# Patient Record
Sex: FEMALE | Race: BLACK OR AFRICAN AMERICAN | NOT HISPANIC OR LATINO | Employment: OTHER | URBAN - METROPOLITAN AREA
[De-identification: names, ages, dates, MRNs, and addresses within clinical notes are randomized per-mention and may not be internally consistent; named-entity substitution may affect disease eponyms.]

---

## 2023-07-06 ENCOUNTER — APPOINTMENT (OUTPATIENT)
Dept: ULTRASOUND IMAGING | Facility: HOSPITAL | Age: 33
End: 2023-07-06
Payer: COMMERCIAL

## 2023-07-06 ENCOUNTER — HOSPITAL ENCOUNTER (EMERGENCY)
Facility: HOSPITAL | Age: 33
Discharge: HOME | End: 2023-07-06
Attending: STUDENT IN AN ORGANIZED HEALTH CARE EDUCATION/TRAINING PROGRAM | Admitting: STUDENT IN AN ORGANIZED HEALTH CARE EDUCATION/TRAINING PROGRAM
Payer: COMMERCIAL

## 2023-07-06 VITALS
WEIGHT: 160 LBS | HEART RATE: 94 BPM | RESPIRATION RATE: 20 BRPM | DIASTOLIC BLOOD PRESSURE: 75 MMHG | BODY MASS INDEX: 31.41 KG/M2 | HEIGHT: 60 IN | OXYGEN SATURATION: 99 % | SYSTOLIC BLOOD PRESSURE: 136 MMHG | TEMPERATURE: 98.9 F

## 2023-07-06 DIAGNOSIS — O20.9 VAGINAL BLEEDING IN PREGNANCY, FIRST TRIMESTER: ICD-10-CM

## 2023-07-06 LAB
ABO/RH(D): NORMAL
ANION GAP SERPL CALCULATED.3IONS-SCNC: 13 MMOL/L (ref 7–15)
ANTIBODY SCREEN: NEGATIVE
APTT PPP: 27 SECONDS (ref 22–38)
BASOPHILS # BLD AUTO: 0.1 10E3/UL (ref 0–0.2)
BASOPHILS NFR BLD AUTO: 1 %
BUN SERPL-MCNC: 5.9 MG/DL (ref 6–20)
CALCIUM SERPL-MCNC: 9.6 MG/DL (ref 8.6–10)
CHLORIDE SERPL-SCNC: 101 MMOL/L (ref 98–107)
CREAT SERPL-MCNC: 0.67 MG/DL (ref 0.51–0.95)
DEPRECATED HCO3 PLAS-SCNC: 22 MMOL/L (ref 22–29)
EOSINOPHIL # BLD AUTO: 0.1 10E3/UL (ref 0–0.7)
EOSINOPHIL NFR BLD AUTO: 1 %
ERYTHROCYTE [DISTWIDTH] IN BLOOD BY AUTOMATED COUNT: 13.1 % (ref 10–15)
GFR SERPL CREATININE-BSD FRML MDRD: >90 ML/MIN/1.73M2
GLUCOSE SERPL-MCNC: 78 MG/DL (ref 70–99)
HCG INTACT+B SERPL-ACNC: 1059 MIU/ML
HCT VFR BLD AUTO: 41.1 % (ref 35–47)
HGB BLD-MCNC: 13.5 G/DL (ref 11.7–15.7)
IMM GRANULOCYTES # BLD: 0.1 10E3/UL
IMM GRANULOCYTES NFR BLD: 1 %
INR PPP: 1.02 (ref 0.85–1.15)
LYMPHOCYTES # BLD AUTO: 3.3 10E3/UL (ref 0.8–5.3)
LYMPHOCYTES NFR BLD AUTO: 24 %
MCH RBC QN AUTO: 25.7 PG (ref 26.5–33)
MCHC RBC AUTO-ENTMCNC: 32.8 G/DL (ref 31.5–36.5)
MCV RBC AUTO: 78 FL (ref 78–100)
MONOCYTES # BLD AUTO: 0.7 10E3/UL (ref 0–1.3)
MONOCYTES NFR BLD AUTO: 5 %
NEUTROPHILS # BLD AUTO: 10 10E3/UL (ref 1.6–8.3)
NEUTROPHILS NFR BLD AUTO: 68 %
NRBC # BLD AUTO: 0 10E3/UL
NRBC BLD AUTO-RTO: 0 /100
PLATELET # BLD AUTO: 308 10E3/UL (ref 150–450)
POTASSIUM SERPL-SCNC: 3.6 MMOL/L (ref 3.4–5.3)
RBC # BLD AUTO: 5.25 10E6/UL (ref 3.8–5.2)
SODIUM SERPL-SCNC: 136 MMOL/L (ref 136–145)
SPECIMEN EXPIRATION DATE: NORMAL
WBC # BLD AUTO: 14.2 10E3/UL (ref 4–11)

## 2023-07-06 PROCEDURE — 84702 CHORIONIC GONADOTROPIN TEST: CPT | Performed by: STUDENT IN AN ORGANIZED HEALTH CARE EDUCATION/TRAINING PROGRAM

## 2023-07-06 PROCEDURE — 258N000003 HC RX IP 258 OP 636

## 2023-07-06 PROCEDURE — 82310 ASSAY OF CALCIUM: CPT | Performed by: STUDENT IN AN ORGANIZED HEALTH CARE EDUCATION/TRAINING PROGRAM

## 2023-07-06 PROCEDURE — 36415 COLL VENOUS BLD VENIPUNCTURE: CPT | Performed by: STUDENT IN AN ORGANIZED HEALTH CARE EDUCATION/TRAINING PROGRAM

## 2023-07-06 PROCEDURE — 96360 HYDRATION IV INFUSION INIT: CPT

## 2023-07-06 PROCEDURE — 85730 THROMBOPLASTIN TIME PARTIAL: CPT | Performed by: STUDENT IN AN ORGANIZED HEALTH CARE EDUCATION/TRAINING PROGRAM

## 2023-07-06 PROCEDURE — 85610 PROTHROMBIN TIME: CPT | Performed by: STUDENT IN AN ORGANIZED HEALTH CARE EDUCATION/TRAINING PROGRAM

## 2023-07-06 PROCEDURE — 99285 EMERGENCY DEPT VISIT HI MDM: CPT | Mod: 25

## 2023-07-06 PROCEDURE — 85004 AUTOMATED DIFF WBC COUNT: CPT | Performed by: STUDENT IN AN ORGANIZED HEALTH CARE EDUCATION/TRAINING PROGRAM

## 2023-07-06 PROCEDURE — 86901 BLOOD TYPING SEROLOGIC RH(D): CPT | Performed by: STUDENT IN AN ORGANIZED HEALTH CARE EDUCATION/TRAINING PROGRAM

## 2023-07-06 PROCEDURE — 86850 RBC ANTIBODY SCREEN: CPT | Performed by: STUDENT IN AN ORGANIZED HEALTH CARE EDUCATION/TRAINING PROGRAM

## 2023-07-06 PROCEDURE — 76801 OB US < 14 WKS SINGLE FETUS: CPT

## 2023-07-06 RX ADMIN — SODIUM CHLORIDE 1000 ML: 9 INJECTION, SOLUTION INTRAVENOUS at 19:55

## 2023-07-06 ASSESSMENT — ACTIVITIES OF DAILY LIVING (ADL): ADLS_ACUITY_SCORE: 35

## 2023-07-06 ASSESSMENT — ENCOUNTER SYMPTOMS: ABDOMINAL PAIN: 0

## 2023-07-06 NOTE — ED TRIAGE NOTES
Patient brought to ED by her  for evaluation of miscarriage and possible need for D&C. Patient stated she started bleeding on 7/4 and was seen at [facility] today. Was between 7-8 weeks pregnant. Has bleeding still but hasn't soaked through a pad today. Patient stated she was told she may need a D&C. Had an US and blood work earlier today.     Triage Assessment     Row Name 07/06/23 7228       Triage Assessment (Adult)    Airway WDL WDL       Respiratory WDL    Respiratory WDL WDL       Skin Circulation/Temperature WDL    Skin Circulation/Temperature WDL WDL       Cardiac WDL    Cardiac WDL WDL       Peripheral/Neurovascular WDL    Peripheral Neurovascular WDL WDL       Cognitive/Neuro/Behavioral WDL    Cognitive/Neuro/Behavioral WDL WDL

## 2023-07-06 NOTE — ED PROVIDER NOTES
EMERGENCY DEPARTMENT ENCOUNTER      NAME: Supa Castro  AGE: 32 year old female  YOB: 1990  MRN: [id number]  EVALUATION DATE & TIME: No admission date for patient encounter.    PCP: No Ref-Primary, Physician    ED PROVIDER: June Rolon PA-C      Chief Complaint   Patient presents with     Miscarriage         FINAL IMPRESSION:  1. Vaginal bleeding in pregnancy, first trimester          ED COURSE & MEDICAL DECISION MAKIN:31 PM I met with the patient to gather history and perform my exam. ED course and treatment discussed. Patient seen in [facility] due to critical capacity and boarding crisis leaving no ED rooms available.  6:55 PM I staffed the patient with Dr. Feng.  10:30 PM I spoke with Dr. Smith with OBGYN  10:33 PM I updated the patient with lab and imaging results and discussed the plan for discharge.       Pertinent Labs & Imaging studies reviewed. (See chart for details)  32 year old female presents to the Emergency Department for evaluation of vaginal bleeding in setting of current pregnancy. History of previous abortions/miscarriages in the past, no history of ectopic pregnancy. Per patient, seen at [facility] earlier today had labs and US performed, left prior to results and states she received a call from ED provider alerting patient to come back to ED for OB/GYN evaluation and possible medication-induced  vs D&C - unfortunately, encounter/labs/imaging are not available in Care Everywhere; therefore, labs and imaging were repeated today. Upon exam, patient is afebrile, hemodynamically stable, and in no acute distress. Benign abdominal exam. Differential diagnosis includes but not limited to spontaneous , missed/incomplete  with retained products of conception, ectopic pregnancy, subchorionic hemorrhage, electrolyte abnormality, anemia, dehydration. Based on patient's presenting symptoms, laboratories and imaging were ordered.    CBC with  leukocytosis at 14.2. BMP unremarkable. Rh +, no indication for RhoGAM. PTT and INR WNL. Beta hCG 1059. US revealed A single elongated irregular-appearing gestational sac is present with small amount of debris within the gestational sac consistent with gestational age of 6 weeks 1 day. Given no access to prior US or beta hCG, unable to definitively determine if pregnancy viable or not and will require repeat US and beta hCG.    Patient declined pain medication upon arrival. Symptoms and workup most consistent with likely spontaneous . Patient was made aware of the above findings. Plan to discharge patient home with strict return precautions and close follow up with their OB/GYN next week for reevaluation with repeat US and beta-hCG. The patient was stable and well appearing upon discharge. The patient was advised to return to the ER if any new or worsening symptoms develop. The patient verbalizes understanding and agrees with the plan.     Medical Decision Making    History:    Supplemental history from: Documented in chart, if applicable    External Record(s) reviewed: Documented in chart, if applicable.    Work Up:    Chart documentation includes differential considered and any EKGs or imaging independently interpreted by provider, where specified.    In additional to work up documented, I considered the following work up: Documented in chart, if applicable.    External consultation:    Discussion of management with another provider: Documented in chart, if applicable and OB-Gyn    Complicating factors:    Care impacted by chronic illness: N/A    Care affected by social determinants of health: N/A    Disposition considerations: Discharge. No recommendations on prescription strength medication(s). See documentation for any additional details.        MEDICATIONS GIVEN IN THE EMERGENCY:  Medications   0.9% sodium chloride BOLUS (0 mLs Intravenous Stopped 23)       NEW PRESCRIPTIONS STARTED AT TODAY'S  "ER VISIT  There are no discharge medications for this patient.         =================================================================    HPI    Patient information was obtained from: Patient     Use of : N/A       Supa Castro is a 32 year old female with a pertinent history of miscarriage (self reported), spontaneous vaginal delivery, HTN, and anxiety who presents to this ED via walk-in for evaluation of a miscarriage    The patient reports she was seen earlier today at [facility] for vaginal bleeding and abdominal cramping. The patient reports qualitative HCG, ultrasound, and CBC were performed. The patient left before results came back because \"they made us pay for parking\". The patient reports she received a call telling her that the ultrasound showed a miscarriage and asking her to come back for a pelvic exam and consult with OB.   The patient also notes was seen at \"[facility]\" last week and had a \"limited ultrasound\" and states they told her \"I can tell you what we don't see\" and that she would be referred to a doctor if something was wrong, but the patient notes she was not referred so she assumed everything was okay.  Of note, upon recheck, the patient reported being seen at [facility] in [place], [place].   Of note, no records are visible of patient's visits to [facility], [facility], or [facility].    The patient reports she \"just recently\" found out she was pregnant and thinks she was about 7 weeks pregnant. The patient notes that she is not feeling any abdominal pain right now and notes that she is still having vaginal bleeding with some clots. The patient has not taken any medications for this.    The patient notes this is not her first pregnancy and has living children. The patient notes a history of prior abortions and denies a history of ectopic pregnancies.    REVIEW OF SYSTEMS   Review of Systems   Constitutional: Negative for chills " and fever.   Respiratory: Negative for shortness of breath.    Cardiovascular: Negative for chest pain, palpitations and leg swelling.   Gastrointestinal: Negative for abdominal pain, nausea and vomiting.   Genitourinary: Positive for vaginal bleeding (with clots). Negative for dysuria, hematuria and pelvic pain.   Neurological: Negative for weakness, light-headedness and headaches.   Hematological: Does not bruise/bleed easily.   All other systems reviewed and are negative.     PAST MEDICAL HISTORY:  No past medical history on file.    PAST SURGICAL HISTORY:  No past surgical history on file.        CURRENT MEDICATIONS:    No current outpatient medications on file.      ALLERGIES:  No Known Allergies    FAMILY HISTORY:  No family history on file.    SOCIAL HISTORY:   Social History     Socioeconomic History     Marital status: Single       VITALS:  /75   Pulse 94   Temp 98.9  F (37.2  C) (Oral)   Resp 20   Ht 1.524 m (5')   Wt 72.6 kg (160 lb)   LMP 05/08/2023 (Approximate)   SpO2 99%   BMI 31.25 kg/m      PHYSICAL EXAM    Constitutional:  Alert, in no acute distress. Cooperative.  EYES: Conjunctivae clear.  HENT:  Atraumatic, normocephalic.  Respiratory:  Respirations even, unlabored, in no acute respiratory distress.  Cardiovascular:  Regular rate, good peripheral perfusion.  GI: Soft, flat, non-distended. No abdominal tenderness to palpation. No guarding, rebound, or peritoneal signs.   Musculoskeletal:  No edema. No cyanosis. Range of motion major extremities intact.    Integument: Warm, Dry.   Neurologic:  Alert & oriented. No focal deficits noted.  Psych: Normal mood and affect.      LAB:  All pertinent labs reviewed and interpreted.  Results for orders placed or performed during the hospital encounter of 07/06/23   OB  US 1st trimester w transvag    Impression    IMPRESSION:   Single irregular gestational sac, which is low-lying within the uterus and contains heterogeneous debris The mean sac  diameter is 1.3 cm which corresponds with a gestational age of 6 weeks 1 day. The absence of a yolk sac and fetal pole at this time is   within normal limits although the irregular appearance of the gestational sac are suspicious for early pregnancy failure, and serial beta-hCG levels be obtained and follow-up ultrasound and 7-13 days is recommended per guidelines listed below.       Findings Suspicious, But Not Diagnostic of Pregnancy Failure    CRL< 7mm and no FHR  MSD 16-24 mm and no embryo  Absence of embryo with FHR 7-10 days after previous US showed a gestational sac with a yolk sac   Absence of embryo with FHR 7-13 days after previous US showed a gestational sac without a yolk sac  Absence of embryo >6 weeks after LMP  Enlarged yolk sac (>7mm)  Empty Amnion  Small gestational sac compared to embryo (MSD-CRL <5mm)    Reference: Diagnostic Criteria for Nonviable Pregnancy Early in the First Trimester. Ultrasound Quarterly; 30(1): 3-9, March 2014   Basic metabolic panel   Result Value Ref Range    Sodium 136 136 - 145 mmol/L    Potassium 3.6 3.4 - 5.3 mmol/L    Chloride 101 98 - 107 mmol/L    Carbon Dioxide (CO2) 22 22 - 29 mmol/L    Anion Gap 13 7 - 15 mmol/L    Urea Nitrogen 5.9 (L) 6.0 - 20.0 mg/dL    Creatinine 0.67 0.51 - 0.95 mg/dL    Calcium 9.6 8.6 - 10.0 mg/dL    Glucose 78 70 - 99 mg/dL    GFR Estimate >90 >60 mL/min/1.73m2   Partial thromboplastin time   Result Value Ref Range    aPTT 27 22 - 38 Seconds   Result Value Ref Range    INR 1.02 0.85 - 1.15   HCG quantitative pregnancy   Result Value Ref Range    hCG Quantitative 1,059 (H) <5 mIU/mL   CBC with platelets and differential   Result Value Ref Range    WBC Count 14.2 (H) 4.0 - 11.0 10e3/uL    RBC Count 5.25 (H) 3.80 - 5.20 10e6/uL    Hemoglobin 13.5 11.7 - 15.7 g/dL    Hematocrit 41.1 35.0 - 47.0 %    MCV 78 78 - 100 fL    MCH 25.7 (L) 26.5 - 33.0 pg    MCHC 32.8 31.5 - 36.5 g/dL    RDW 13.1 10.0 - 15.0 %    Platelet Count 308 150 - 450 10e3/uL     % Neutrophils 68 %    % Lymphocytes 24 %    % Monocytes 5 %    % Eosinophils 1 %    % Basophils 1 %    % Immature Granulocytes 1 %    NRBCs per 100 WBC 0 <1 /100    Absolute Neutrophils 10.0 (H) 1.6 - 8.3 10e3/uL    Absolute Lymphocytes 3.3 0.8 - 5.3 10e3/uL    Absolute Monocytes 0.7 0.0 - 1.3 10e3/uL    Absolute Eosinophils 0.1 0.0 - 0.7 10e3/uL    Absolute Basophils 0.1 0.0 - 0.2 10e3/uL    Absolute Immature Granulocytes 0.1 <=0.4 10e3/uL    Absolute NRBCs 0.0 10e3/uL   Adult Type and Screen   Result Value Ref Range    ABO/RH(D) A POS     Antibody Screen Negative Negative    SPECIMEN EXPIRATION DATE [id number]        RADIOLOGY:  Reviewed all pertinent imaging. Please see official radiology report.  OB  US 1st trimester w transvag   Final Result   IMPRESSION:    Single irregular gestational sac, which is low-lying within the uterus and contains heterogeneous debris The mean sac diameter is 1.3 cm which corresponds with a gestational age of 6 weeks 1 day. The absence of a yolk sac and fetal pole at this time is    within normal limits although the irregular appearance of the gestational sac are suspicious for early pregnancy failure, and serial beta-hCG levels be obtained and follow-up ultrasound and 7-13 days is recommended per guidelines listed below.          Findings Suspicious, But Not Diagnostic of Pregnancy Failure      CRL< 7mm and no FHR   MSD 16-24 mm and no embryo   Absence of embryo with FHR 7-10 days after previous US showed a gestational sac with a yolk sac    Absence of embryo with FHR 7-13 days after previous US showed a gestational sac without a yolk sac   Absence of embryo >6 weeks after LMP   Enlarged yolk sac (>7mm)   Empty Amnion   Small gestational sac compared to embryo (MSD-CRL <5mm)      Reference: Diagnostic Criteria for Nonviable Pregnancy Early in the First Trimester. Ultrasound Quarterly; 30(1): 3-9, March 2014        Nayla ROSENBERG, [place] serving as a scribe to document services  personally performed by June Rolon PA-C based on my observation and the provider's statements to me. I, June Rolon PA-C, attest that Nayla Elena is acting in a scribe capacity, has observed my performance of the services and has documented them in accordance with my direction.    June Rolon PA-C  [facility] EMERGENCY DEPARTMENT  [address] [place] [id number]  [phone]      June Rolon PA-C  07/07/23 0023

## 2023-07-07 ASSESSMENT — ENCOUNTER SYMPTOMS
CHILLS: 0
SHORTNESS OF BREATH: 0
HEADACHES: 0
FEVER: 0
VOMITING: 0
DYSURIA: 0
NAUSEA: 0
WEAKNESS: 0
BRUISES/BLEEDS EASILY: 0
LIGHT-HEADEDNESS: 0
HEMATURIA: 0
PALPITATIONS: 0

## 2023-07-07 NOTE — ED PROVIDER NOTES
Emergency Department Midlevel Supervisory Note     I personally saw the patient and performed a substantive portion of the visit including all aspects of the medical decision making.    ED Course:  6:55 PM June Rolon PA-C staffed patient with me. I agree with their assessment and plan of management, and I will see the patient.    Brief HPI:     Supa Castro is a 32 year old female who presents for evaluation of vaginal bleeding. Evaluated at [facility] earlier today had labs and US performed, left prior to results and states she received a call from ED provider alerting patient to come back to ED for OB/GYN evaluation and possible medication-induced  vs D&C. Repeat workup as we do not have access to those via CareEverywhere.    MDM:  32-year-old female who estimates she is approximately 7 to 8 weeks pregnant who presents with vaginal bleeding.  Patient's had very light vaginal bleeding.  She says she had a work-up at [facility] today and was called and told to come back for possible DIC or medication.  We can not find any of this information in care everywhere. Beta HCG 1059. She is Rh+ and does not require RhoGAM.  No significant bleeding and reassuring hemoglobin.  Ultrasound shows irregular gestational sac (low laying, gestitional age of 6 weeks, 1 day) which could be within the normal limits but are suspicious for early pregnancy failure. This could be an early pregnancy vs miscarriage. We did consult OB on-call here.  They agree with the plan for discharge with close outpatient follow up.         1. Vaginal bleeding in pregnancy, first trimester        Labs and Imaging:  Results for orders placed or performed during the hospital encounter of 23   OB  US 1st trimester w transvag    Impression    IMPRESSION:   Single irregular gestational sac, which is low-lying within the uterus and contains heterogeneous debris The mean sac diameter is 1.3 cm which corresponds with a  gestational age of 6 weeks 1 day. The absence of a yolk sac and fetal pole at this time is   within normal limits although the irregular appearance of the gestational sac are suspicious for early pregnancy failure, and serial beta-hCG levels be obtained and follow-up ultrasound and 7-13 days is recommended per guidelines listed below.       Findings Suspicious, But Not Diagnostic of Pregnancy Failure    CRL< 7mm and no FHR  MSD 16-24 mm and no embryo  Absence of embryo with FHR 7-10 days after previous US showed a gestational sac with a yolk sac   Absence of embryo with FHR 7-13 days after previous US showed a gestational sac without a yolk sac  Absence of embryo >6 weeks after LMP  Enlarged yolk sac (>7mm)  Empty Amnion  Small gestational sac compared to embryo (MSD-CRL <5mm)    Reference: Diagnostic Criteria for Nonviable Pregnancy Early in the First Trimester. Ultrasound Quarterly; 30(1): 3-9, March 2014   Basic metabolic panel   Result Value Ref Range    Sodium 136 136 - 145 mmol/L    Potassium 3.6 3.4 - 5.3 mmol/L    Chloride 101 98 - 107 mmol/L    Carbon Dioxide (CO2) 22 22 - 29 mmol/L    Anion Gap 13 7 - 15 mmol/L    Urea Nitrogen 5.9 (L) 6.0 - 20.0 mg/dL    Creatinine 0.67 0.51 - 0.95 mg/dL    Calcium 9.6 8.6 - 10.0 mg/dL    Glucose 78 70 - 99 mg/dL    GFR Estimate >90 >60 mL/min/1.73m2   Partial thromboplastin time   Result Value Ref Range    aPTT 27 22 - 38 Seconds   Result Value Ref Range    INR 1.02 0.85 - 1.15   HCG quantitative pregnancy   Result Value Ref Range    hCG Quantitative 1,059 (H) <5 mIU/mL   CBC with platelets and differential   Result Value Ref Range    WBC Count 14.2 (H) 4.0 - 11.0 10e3/uL    RBC Count 5.25 (H) 3.80 - 5.20 10e6/uL    Hemoglobin 13.5 11.7 - 15.7 g/dL    Hematocrit 41.1 35.0 - 47.0 %    MCV 78 78 - 100 fL    MCH 25.7 (L) 26.5 - 33.0 pg    MCHC 32.8 31.5 - 36.5 g/dL    RDW 13.1 10.0 - 15.0 %    Platelet Count 308 150 - 450 10e3/uL    % Neutrophils 68 %    % Lymphocytes 24 %     % Monocytes 5 %    % Eosinophils 1 %    % Basophils 1 %    % Immature Granulocytes 1 %    NRBCs per 100 WBC 0 <1 /100    Absolute Neutrophils 10.0 (H) 1.6 - 8.3 10e3/uL    Absolute Lymphocytes 3.3 0.8 - 5.3 10e3/uL    Absolute Monocytes 0.7 0.0 - 1.3 10e3/uL    Absolute Eosinophils 0.1 0.0 - 0.7 10e3/uL    Absolute Basophils 0.1 0.0 - 0.2 10e3/uL    Absolute Immature Granulocytes 0.1 <=0.4 10e3/uL    Absolute NRBCs 0.0 10e3/uL   Adult Type and Screen   Result Value Ref Range    ABO/RH(D) A POS     Antibody Screen Negative Negative    SPECIMEN EXPIRATION DATE [id number]      I have reviewed the relevant laboratory and radiology studies    Procedures:  I was present for the key portions of this procedure: none      I, Silver Alvarez, am serving as a scribe to document services personally performed by Dr. Tamiko Feng based on my observation and the provider's statements to me. I, Tamiko Feng MD attest that Silver Alvarez is acting in a scribe capacity, has observed my performance of the services and has documented them in accordance with my direction.     Tamiko Feng M.D.  Emergency Medicine  [facility] EMERGENCY DEPARTMENT  [address] [facility] [id number] [phone]  Dept: [phone]     Tamiko Feng MD  07/06/23 8891

## 2023-07-07 NOTE — DISCHARGE INSTRUCTIONS
You were seen in the ER for evaluation of vaginal bleeding during pregnancy. Your ultrasound showed evidence of intrauterine pregnancy, but unable to determine viability at this time as discussed.     Please follow up with your OB/GYN for repeat ultrasound and beta-hCG.     Return to the ER if any new or worsening symptoms develop including increased vaginal bleeding (soaking pad/tampon every hour), abdominal pain, chest pain, shortness of breath, palpitations, lightheaded/dizzy, fainting, or any other concerning symptoms.

## 2024-05-03 ENCOUNTER — HOSPITAL ENCOUNTER (EMERGENCY)
Facility: HOSPITAL | Age: 34
Discharge: HOME | End: 2024-05-03
Attending: EMERGENCY MEDICINE | Admitting: EMERGENCY MEDICINE

## 2024-05-03 VITALS
RESPIRATION RATE: 18 BRPM | HEART RATE: 75 BPM | TEMPERATURE: 98.4 F | OXYGEN SATURATION: 100 % | DIASTOLIC BLOOD PRESSURE: 94 MMHG | SYSTOLIC BLOOD PRESSURE: 141 MMHG

## 2024-05-03 DIAGNOSIS — L50.9 URTICARIA: ICD-10-CM

## 2024-05-03 DIAGNOSIS — T78.40XA ALLERGIC REACTION, INITIAL ENCOUNTER: ICD-10-CM

## 2024-05-03 PROBLEM — F41.9 ANXIETY: Status: ACTIVE | Noted: 2022-12-01

## 2024-05-03 PROBLEM — I10 PRIMARY HYPERTENSION: Status: ACTIVE | Noted: 2022-12-01

## 2024-05-03 PROCEDURE — 250N000012 HC RX MED GY IP 250 OP 636 PS 637: Performed by: EMERGENCY MEDICINE

## 2024-05-03 PROCEDURE — 99283 EMERGENCY DEPT VISIT LOW MDM: CPT

## 2024-05-03 PROCEDURE — 250N000013 HC RX MED GY IP 250 OP 250 PS 637: Performed by: EMERGENCY MEDICINE

## 2024-05-03 RX ORDER — DIPHENHYDRAMINE HCL 25 MG
25 CAPSULE ORAL ONCE
Status: COMPLETED | OUTPATIENT
Start: 2024-05-03 | End: 2024-05-03

## 2024-05-03 RX ORDER — DIPHENHYDRAMINE HCL 25 MG
25 CAPSULE ORAL EVERY 6 HOURS PRN
Qty: 16 CAPSULE | Refills: 0 | Status: SHIPPED | OUTPATIENT
Start: 2024-05-03

## 2024-05-03 RX ORDER — METHYLPREDNISOLONE 4 MG
TABLET, DOSE PACK ORAL
Qty: 21 TABLET | Refills: 0 | Status: SHIPPED | OUTPATIENT
Start: 2024-05-03

## 2024-05-03 RX ORDER — PREDNISONE 20 MG/1
20 TABLET ORAL ONCE
Status: COMPLETED | OUTPATIENT
Start: 2024-05-03 | End: 2024-05-03

## 2024-05-03 RX ADMIN — DIPHENHYDRAMINE HCL 25 MG: 25 CAPSULE ORAL at 01:29

## 2024-05-03 RX ADMIN — PREDNISONE 20 MG: 20 TABLET ORAL at 01:29

## 2024-05-03 ASSESSMENT — COLUMBIA-SUICIDE SEVERITY RATING SCALE - C-SSRS
1. IN THE PAST MONTH, HAVE YOU WISHED YOU WERE DEAD OR WISHED YOU COULD GO TO SLEEP AND NOT WAKE UP?: NO
2. HAVE YOU ACTUALLY HAD ANY THOUGHTS OF KILLING YOURSELF IN THE PAST MONTH?: NO
6. HAVE YOU EVER DONE ANYTHING, STARTED TO DO ANYTHING, OR PREPARED TO DO ANYTHING TO END YOUR LIFE?: NO

## 2024-05-03 NOTE — ED TRIAGE NOTES
Pt arrives to triage ambulatory endorsing put recently dyed wig on 4 hours ago and as of 2 hours ago pt began to have itchiness and swelling and hives on forehead. No other s/s
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
## 2024-07-18 ENCOUNTER — HOSPITAL ENCOUNTER (EMERGENCY)
Facility: HOSPITAL | Age: 34
Discharge: LEFT BEFORE DISCHARGE | End: 2024-07-18
Admitting: STUDENT IN AN ORGANIZED HEALTH CARE EDUCATION/TRAINING PROGRAM

## 2024-07-18 VITALS
TEMPERATURE: 98.4 F | OXYGEN SATURATION: 98 % | SYSTOLIC BLOOD PRESSURE: 134 MMHG | HEIGHT: 61 IN | RESPIRATION RATE: 16 BRPM | HEART RATE: 80 BPM | WEIGHT: 160 LBS | BODY MASS INDEX: 30.21 KG/M2 | DIASTOLIC BLOOD PRESSURE: 90 MMHG

## 2024-07-18 PROCEDURE — 99281 EMR DPT VST MAYX REQ PHY/QHP: CPT

## 2024-07-18 ASSESSMENT — COLUMBIA-SUICIDE SEVERITY RATING SCALE - C-SSRS
2. HAVE YOU ACTUALLY HAD ANY THOUGHTS OF KILLING YOURSELF IN THE PAST MONTH?: NO
1. IN THE PAST MONTH, HAVE YOU WISHED YOU WERE DEAD OR WISHED YOU COULD GO TO SLEEP AND NOT WAKE UP?: NO
6. HAVE YOU EVER DONE ANYTHING, STARTED TO DO ANYTHING, OR PREPARED TO DO ANYTHING TO END YOUR LIFE?: NO

## 2024-07-18 NOTE — ED TRIAGE NOTES
Patient noticed a bump on her forehead this am.  This afternoon she has developed more bumps, just on the forehead.  They are itchy, not affecting her face or airway.  Otherwise feels okay.

## 2024-12-19 ENCOUNTER — HOSPITAL ENCOUNTER (EMERGENCY)
Facility: HOSPITAL | Age: 34
Discharge: HOME | End: 2024-12-19
Attending: EMERGENCY MEDICINE
Payer: COMMERCIAL

## 2024-12-19 ENCOUNTER — APPOINTMENT (OUTPATIENT)
Dept: CT IMAGING | Facility: HOSPITAL | Age: 34
End: 2024-12-19
Attending: EMERGENCY MEDICINE
Payer: COMMERCIAL

## 2024-12-19 VITALS
BODY MASS INDEX: 28.32 KG/M2 | OXYGEN SATURATION: 100 % | HEIGHT: 61 IN | SYSTOLIC BLOOD PRESSURE: 115 MMHG | RESPIRATION RATE: 18 BRPM | TEMPERATURE: 98.9 F | HEART RATE: 75 BPM | WEIGHT: 150 LBS | DIASTOLIC BLOOD PRESSURE: 76 MMHG

## 2024-12-19 DIAGNOSIS — R10.32 LEFT LOWER QUADRANT ABDOMINAL PAIN: ICD-10-CM

## 2024-12-19 LAB
ALBUMIN SERPL BCG-MCNC: 4.3 G/DL (ref 3.5–5.2)
ALP SERPL-CCNC: 48 U/L (ref 40–150)
ALT SERPL W P-5'-P-CCNC: 10 U/L (ref 0–50)
ANION GAP SERPL CALCULATED.3IONS-SCNC: 11 MMOL/L (ref 7–15)
AST SERPL W P-5'-P-CCNC: 13 U/L (ref 0–45)
BASOPHILS # BLD AUTO: 0.1 10E3/UL (ref 0–0.2)
BASOPHILS NFR BLD AUTO: 1 %
BILIRUB SERPL-MCNC: 0.4 MG/DL
BUN SERPL-MCNC: 8 MG/DL (ref 6–20)
CALCIUM SERPL-MCNC: 9.6 MG/DL (ref 8.8–10.4)
CHLORIDE SERPL-SCNC: 106 MMOL/L (ref 98–107)
CREAT SERPL-MCNC: 0.69 MG/DL (ref 0.51–0.95)
EGFRCR SERPLBLD CKD-EPI 2021: >90 ML/MIN/1.73M2
EOSINOPHIL # BLD AUTO: 0.1 10E3/UL (ref 0–0.7)
EOSINOPHIL NFR BLD AUTO: 1 %
ERYTHROCYTE [DISTWIDTH] IN BLOOD BY AUTOMATED COUNT: 12.9 % (ref 10–15)
GLUCOSE SERPL-MCNC: 104 MG/DL (ref 70–99)
HCG SERPL QL: NEGATIVE
HCO3 SERPL-SCNC: 22 MMOL/L (ref 22–29)
HCT VFR BLD AUTO: 41.8 % (ref 35–47)
HGB BLD-MCNC: 14.5 G/DL (ref 11.7–15.7)
HOLD SPECIMEN: NORMAL
IMM GRANULOCYTES # BLD: 0 10E3/UL
IMM GRANULOCYTES NFR BLD: 0 %
LIPASE SERPL-CCNC: 20 U/L (ref 13–60)
LYMPHOCYTES # BLD AUTO: 3.4 10E3/UL (ref 0.8–5.3)
LYMPHOCYTES NFR BLD AUTO: 31 %
MCH RBC QN AUTO: 26.8 PG (ref 26.5–33)
MCHC RBC AUTO-ENTMCNC: 34.7 G/DL (ref 31.5–36.5)
MCV RBC AUTO: 77 FL (ref 78–100)
MONOCYTES # BLD AUTO: 0.6 10E3/UL (ref 0–1.3)
MONOCYTES NFR BLD AUTO: 5 %
NEUTROPHILS # BLD AUTO: 6.9 10E3/UL (ref 1.6–8.3)
NEUTROPHILS NFR BLD AUTO: 62 %
NRBC # BLD AUTO: 0 10E3/UL
NRBC BLD AUTO-RTO: 0 /100
PLATELET # BLD AUTO: 251 10E3/UL (ref 150–450)
POTASSIUM SERPL-SCNC: 3.7 MMOL/L (ref 3.4–5.3)
PROT SERPL-MCNC: 7.3 G/DL (ref 6.4–8.3)
RBC # BLD AUTO: 5.42 10E6/UL (ref 3.8–5.2)
SODIUM SERPL-SCNC: 139 MMOL/L (ref 135–145)
WBC # BLD AUTO: 11 10E3/UL (ref 4–11)

## 2024-12-19 PROCEDURE — 84155 ASSAY OF PROTEIN SERUM: CPT | Performed by: EMERGENCY MEDICINE

## 2024-12-19 PROCEDURE — 85004 AUTOMATED DIFF WBC COUNT: CPT | Performed by: EMERGENCY MEDICINE

## 2024-12-19 PROCEDURE — 83690 ASSAY OF LIPASE: CPT | Performed by: EMERGENCY MEDICINE

## 2024-12-19 PROCEDURE — 36415 COLL VENOUS BLD VENIPUNCTURE: CPT | Performed by: STUDENT IN AN ORGANIZED HEALTH CARE EDUCATION/TRAINING PROGRAM

## 2024-12-19 PROCEDURE — 84703 CHORIONIC GONADOTROPIN ASSAY: CPT | Performed by: EMERGENCY MEDICINE

## 2024-12-19 PROCEDURE — 99285 EMERGENCY DEPT VISIT HI MDM: CPT | Mod: 25

## 2024-12-19 PROCEDURE — 74177 CT ABD & PELVIS W/CONTRAST: CPT

## 2024-12-19 PROCEDURE — 82247 BILIRUBIN TOTAL: CPT | Performed by: EMERGENCY MEDICINE

## 2024-12-19 PROCEDURE — 250N000011 HC RX IP 250 OP 636: Performed by: EMERGENCY MEDICINE

## 2024-12-19 RX ORDER — IOPAMIDOL 755 MG/ML
90 INJECTION, SOLUTION INTRAVASCULAR ONCE
Status: COMPLETED | OUTPATIENT
Start: 2024-12-19 | End: 2024-12-19

## 2024-12-19 RX ADMIN — IOPAMIDOL 90 ML: 755 INJECTION, SOLUTION INTRAVENOUS at 21:01

## 2024-12-19 ASSESSMENT — ACTIVITIES OF DAILY LIVING (ADL)
ADLS_ACUITY_SCORE: 41
ADLS_ACUITY_SCORE: 41

## 2024-12-19 ASSESSMENT — COLUMBIA-SUICIDE SEVERITY RATING SCALE - C-SSRS
6. HAVE YOU EVER DONE ANYTHING, STARTED TO DO ANYTHING, OR PREPARED TO DO ANYTHING TO END YOUR LIFE?: NO
2. HAVE YOU ACTUALLY HAD ANY THOUGHTS OF KILLING YOURSELF IN THE PAST MONTH?: NO
1. IN THE PAST MONTH, HAVE YOU WISHED YOU WERE DEAD OR WISHED YOU COULD GO TO SLEEP AND NOT WAKE UP?: NO

## 2024-12-19 NOTE — ED TRIAGE NOTES
Patient presents to the ER for concerns of constipation with left sided abdominal pain.  On going for last 2 weeks.  Did had a BM that was normal for her yesterday but then developed a hemorrhoid.      States she has times where she gets the urge to go or has gas but nothing will happen.

## 2024-12-20 NOTE — ED PROVIDER NOTES
EMERGENCY DEPARTMENT ENCOUNTER      NAME: Supa Castro  AGE: 34 year old female  YOB: 1990  MRN: [id number]  EVALUATION DATE & TIME: No admission date for patient encounter.    PCP: No Ref-Primary, Physician    ED PROVIDER: Jesus Sanchez M.D.      Chief Complaint   Patient presents with    Abdominal Pain         FINAL IMPRESSION:  1. Left lower quadrant abdominal pain          ED COURSE & MEDICAL DECISION MAKIN:24 PM I met with the patient to obtain patient history and performed a physical exam. Discussed plan for ED work up including potential diagnostic studies and interventions.  9:36 PM Repeat exam is benign. Discussed findings and discharge.     Pertinent Labs & Imaging studies reviewed. (See chart for details)  34 year old female presents to the Emergency Department for evaluation of abdominal pain. Patient appears non toxic with stable vitals signs, patient afebrile with no hypoxia, no increased work of breathing.  Lungs are clear, abdomen is benign.  Per review of the medical record, did review office visit through [facility], on 2022 where patient was being seen for hospital follow-up secondary to headache and upper respiratory viral symptoms, past medical history noted in that encounter showed acid reflux, anxiety, hypertension, panic attack.  Abdomen here is quite benign, story is atypical, no flank pain or urinary symptoms to suggest kidney stone, pyelonephritis or cystitis, no vaginal bleeding or discharge, no pelvic pain to suggest tubo-ovarian abscess, ovarian cyst or torsion, ectopic, miscarriage, PID or STI.  Considered diverticulitis, less likely atypical appendicitis, no focal tenderness suggest cholecystitis, less likely pancreatitis.  We will obtain screening labs and CT imaging the abdomen pelvis.  Nothing to suggest atypical ACS, PE or dissection.  Patient off her pain medications.  Considered urinalysis but again no flank  pain or urinary symptoms.    Reassessment: Labs by my independent interpretation showed no signs of acute kidney injury with a creatinine of 0.69 and no signs of anemia with a hemoglobin of 14.5, no signs of pancreatitis with a lipase of 20, no signs of biliary infection or obstruction with normal LFTs, alk phos of 48 and bilirubin 0.4.  Pregnancy negative.  CT imaging returned and by my independent interpretation showed no free air and by report showed no acute concerning findings.  Repeat exam of the patient was benign.  Of note she again mentioned having a hemorrhoid popped, she states hemorrhoids are not new for her, she denies any active bleeding or rectal pain, I recommended sitz bath's and otherwise outpatient conservative management close follow-up.  In regards to her abdominal findings here today provided some reassurance, with negative workup for she is safe for discharge, did provide contact information for [facility] for outpatient follow-up as needed.  Discussed all these finds recommendations with the patient felt reassured comfortable discharge.  Return precaution provided.    Medical Decision Making  Obtained supplemental history:Supplemental history obtained?: No  Reviewed external records: External records reviewed?: Outpatient Record: [facility] PCP 12/1/2022  Care impacted by chronic illness:Hypertension and Other: GARFIELD  Did you consider but not order tests?: Work up considered but not performed and documented in chart, if applicable  Did you interpret images independently?: Independent interpretation of ECG and images noted in documentation, when applicable.  Consultation discussion with other provider:Did you involve another provider (consultant, , pharmacy, etc.)?: No  Discharge. No recommendations on prescription strength medication(s). See documentation for any additional details.    MIPS: Not Applicable        At the conclusion of the encounter I discussed the results of all of the tests and  "the disposition. The questions were answered and return precautions provided. The patient or family acknowledged understanding and was agreeable with the care plan.         MEDICATIONS GIVEN IN THE EMERGENCY:  Medications   iopamidol (ISOVUE-370) solution 90 mL (90 mLs Intravenous $Given 12/19/24 2101)       NEW PRESCRIPTIONS STARTED AT TODAY'S ER VISIT  Discharge Medication List as of 12/19/2024  9:42 PM               =================================================================    HPI    Patient information was obtained from: patient    Use of Intrepreter: N/A         Supa Nina Castro is a 34 year old female with pertinent medical history of HTN, GARFIELD, who presents abdominal pain.    Patient reports for the last 2 weeks, she notes increased gassiness. She notes whenever she bends over, she feels some left sided flank pain and when she straightens herself upright, she feels the pain radiate to the front of her left abdomen and eventually she has fluctuance. Describes the pain as a \"gas bubble\" that travels from the back to the front and waxes and wanes from 4-8/10. She's never had this before. She's had constipation within the past 2 weeks and has taken milk of magnesium for this with relief. Today, she developed a hemorrhoid that \"popped\" and due to persistent symptoms, she decided to come into the ED. Currently, the pain has subsided. She denies any changes in diet. She doesn't take any routine medications. She denies history of abdominal surgeries or kidney stones.     She otherwise denies associating fever, nausea, vomiting, blood in stool, chest pain, shortness of breath, vaginal discharge, or vaginal bleeding. There were no other concerns/complaints at this time.      PAST MEDICAL HISTORY:  Past Medical History:   Diagnosis Date    NO ACTIVE PROBLEMS        PAST SURGICAL HISTORY:  Past Surgical History:   Procedure Laterality Date    NO HISTORY OF SURGERY           CURRENT MEDICATIONS:    Prior to " "Admission medications    Medication Sig Start Date End Date Taking? Authorizing Provider   diphenhydrAMINE (BENADRYL) 25 MG capsule Take 1 capsule (25 mg) by mouth every 6 hours as needed for itching or allergies 5/3/24   Peng Nguyen MD   LACTATED RINGERS IV SOLN Fluid flush x 2 liters 10/24/08   Siomara Lucero NP   methylPREDNISolone (MEDROL DOSEPAK) 4 MG tablet therapy pack Follow Package Directions 5/3/24   Peng Nguyen MD   ZOFRAN 2 MG/ML IV SOLN 4 MG  10/24/08   Siomara Lucero NP   ZOFRAN 4 MG OR TABS 1-cf2 TABLETS 3 TIMES DAILY 10/24/08   Siomara Lucero NP   ZOFRAN 4 MG OR TABS 1-2 TABLETS 3 TIMES DAILY 10/24/08   Siomara Lucero NP            VITALS:  Patient Vitals for the past 24 hrs:   BP Temp Temp src Pulse Resp SpO2 Height Weight   12/19/24 2145 115/76 -- -- 75 -- 100 % -- --   12/19/24 2045 134/81 -- -- -- -- -- -- --   12/19/24 2041 -- -- -- 77 -- 100 % -- --   12/19/24 2000 (!) 145/87 -- -- 76 -- 100 % -- --   12/19/24 1709 (!) 156/103 98.9  F (37.2  C) Oral 112 18 99 % 1.549 m (5' 1\") 68 kg (150 lb)        PHYSICAL EXAM    Constitutional:  Awake, alert, in no apparent distress  HENT:  Normocephalic, Atraumatic. Bilateral external ears normal. Oropharynx moist. Nose normal. Neck- Normal range of motion with no guarding, No midline cervical tenderness, Supple, No stridor.   Eyes:  PERRL, EOMI with no signs of entrapment, Conjunctiva normal, No discharge.   Respiratory:  Normal breath sounds, No respiratory distress, No wheezing.    Cardiovascular:  Normal heart rate, Normal rhythm, No appreciable rubs or gallops.   GI:  Soft, No tenderness, No distension, No palpable masses  Gu: No CVA tenderness  Musculoskeletal:  Intact distal pulses, No edema. Good range of motion in all major joints. No tenderness to palpation or major deformities noted.  Integument:  Warm, Dry, No erythema, No rash.   Neurologic:  Alert & oriented, Normal motor function, Normal sensory " function, No focal deficits noted.   Psychiatric:  Affect normal, Judgment normal, Mood normal.     LAB:  All pertinent labs reviewed and interpreted.  Results for orders placed or performed during the hospital encounter of 12/19/24   CT Abdomen Pelvis w Contrast    Impression    IMPRESSION:   No acute findings or other explanation for symptoms.   Extra Blue Top Tube   Result Value Ref Range    Hold Specimen JIC    Extra Red Top Tube   Result Value Ref Range    Hold Specimen JIC    Extra Green Top (Lithium Heparin) Tube   Result Value Ref Range    Hold Specimen JIC    Extra Purple Top Tube   Result Value Ref Range    Hold Specimen JIC    Comprehensive metabolic panel   Result Value Ref Range    Sodium 139 135 - 145 mmol/L    Potassium 3.7 3.4 - 5.3 mmol/L    Carbon Dioxide (CO2) 22 22 - 29 mmol/L    Anion Gap 11 7 - 15 mmol/L    Urea Nitrogen 8.0 6.0 - 20.0 mg/dL    Creatinine 0.69 0.51 - 0.95 mg/dL    GFR Estimate >90 >60 mL/min/1.73m2    Calcium 9.6 8.8 - 10.4 mg/dL    Chloride 106 98 - 107 mmol/L    Glucose 104 (H) 70 - 99 mg/dL    Alkaline Phosphatase 48 40 - 150 U/L    AST 13 0 - 45 U/L    ALT 10 0 - 50 U/L    Protein Total 7.3 6.4 - 8.3 g/dL    Albumin 4.3 3.5 - 5.2 g/dL    Bilirubin Total 0.4 <=1.2 mg/dL   Result Value Ref Range    Lipase 20 13 - 60 U/L   HCG qualitative Blood   Result Value Ref Range    hCG Serum Qualitative Negative Negative   CBC with platelets and differential   Result Value Ref Range    WBC Count 11.0 4.0 - 11.0 10e3/uL    RBC Count 5.42 (H) 3.80 - 5.20 10e6/uL    Hemoglobin 14.5 11.7 - 15.7 g/dL    Hematocrit 41.8 35.0 - 47.0 %    MCV 77 (L) 78 - 100 fL    MCH 26.8 26.5 - 33.0 pg    MCHC 34.7 31.5 - 36.5 g/dL    RDW 12.9 10.0 - 15.0 %    Platelet Count 251 150 - 450 10e3/uL    % Neutrophils 62 %    % Lymphocytes 31 %    % Monocytes 5 %    % Eosinophils 1 %    % Basophils 1 %    % Immature Granulocytes 0 %    NRBCs per 100 WBC 0 <1 /100    Absolute Neutrophils 6.9 1.6 - 8.3 10e3/uL     Absolute Lymphocytes 3.4 0.8 - 5.3 10e3/uL    Absolute Monocytes 0.6 0.0 - 1.3 10e3/uL    Absolute Eosinophils 0.1 0.0 - 0.7 10e3/uL    Absolute Basophils 0.1 0.0 - 0.2 10e3/uL    Absolute Immature Granulocytes 0.0 <=0.4 10e3/uL    Absolute NRBCs 0.0 10e3/uL       RADIOLOGY:  CT Abdomen Pelvis w Contrast   Final Result   IMPRESSION:    No acute findings or other explanation for symptoms.             EKG:    None    PROCEDURES:   None      I, Heather Patel, am serving as a scribe to document services personally performed by Jesus Sanchez MD, based on my observation and the provider's statements to me. I, Jesus Sanchez MD attest that Heather Patel is acting in a scribe capacity, has observed my performance of the services and has documented them in accordance with my direction.    Jesus Sanchez M.D.  Emergency Medicine  [facility] EMERGENCY DEPARTMENT  [facility] [facility] [id number] [phone]  Dept: [phone]     Jesus Sanchez MD  12/19/24 0900